# Patient Record
Sex: MALE | Race: WHITE | Employment: FULL TIME | URBAN - METROPOLITAN AREA
[De-identification: names, ages, dates, MRNs, and addresses within clinical notes are randomized per-mention and may not be internally consistent; named-entity substitution may affect disease eponyms.]

---

## 2018-05-06 ENCOUNTER — HOSPITAL ENCOUNTER (EMERGENCY)
Age: 58
Discharge: HOME | End: 2018-05-06
Attending: EMERGENCY MEDICINE
Payer: COMMERCIAL

## 2018-05-06 ENCOUNTER — APPOINTMENT (OUTPATIENT)
Dept: ULTRASOUND IMAGING | Age: 58
End: 2018-05-06
Payer: COMMERCIAL

## 2018-05-06 VITALS
WEIGHT: 265 LBS | OXYGEN SATURATION: 97 % | DIASTOLIC BLOOD PRESSURE: 88 MMHG | RESPIRATION RATE: 18 BRPM | HEART RATE: 80 BPM | SYSTOLIC BLOOD PRESSURE: 152 MMHG | TEMPERATURE: 98 F | BODY MASS INDEX: 37.94 KG/M2 | HEIGHT: 70 IN

## 2018-05-06 DIAGNOSIS — M79.604 RIGHT LEG PAIN: Primary | ICD-10-CM

## 2018-05-06 DIAGNOSIS — S80.11XA CONTUSION OF RIGHT LOWER EXTREMITY, INITIAL ENCOUNTER: ICD-10-CM

## 2018-05-06 PROCEDURE — 99283 EMERGENCY DEPT VISIT LOW MDM: CPT

## 2018-05-06 PROCEDURE — 93971 EXTREMITY STUDY: CPT

## 2018-05-06 ASSESSMENT — PAIN DESCRIPTION - ORIENTATION: ORIENTATION: RIGHT

## 2018-05-06 ASSESSMENT — PAIN DESCRIPTION - LOCATION: LOCATION: LEG

## 2018-05-06 ASSESSMENT — PAIN DESCRIPTION - PAIN TYPE: TYPE: ACUTE PAIN

## 2018-05-06 ASSESSMENT — PAIN SCALES - GENERAL: PAINLEVEL_OUTOF10: 4

## 2018-05-06 ASSESSMENT — PAIN DESCRIPTION - FREQUENCY: FREQUENCY: CONTINUOUS

## 2021-10-11 ENCOUNTER — APPOINTMENT (OUTPATIENT)
Dept: GENERAL RADIOLOGY | Age: 61
End: 2021-10-11
Payer: COMMERCIAL

## 2021-10-11 ENCOUNTER — HOSPITAL ENCOUNTER (EMERGENCY)
Age: 61
Discharge: HOME | End: 2021-10-11
Payer: COMMERCIAL

## 2021-10-11 VITALS
TEMPERATURE: 97 F | HEART RATE: 83 BPM | HEIGHT: 70 IN | OXYGEN SATURATION: 97 % | DIASTOLIC BLOOD PRESSURE: 87 MMHG | WEIGHT: 270 LBS | SYSTOLIC BLOOD PRESSURE: 146 MMHG | RESPIRATION RATE: 18 BRPM | BODY MASS INDEX: 38.65 KG/M2

## 2021-10-11 DIAGNOSIS — S91.114A LACERATION OF LESSER TOE OF RIGHT FOOT WITHOUT FOREIGN BODY PRESENT OR DAMAGE TO NAIL, INITIAL ENCOUNTER: Primary | ICD-10-CM

## 2021-10-11 DIAGNOSIS — Z23 NEED FOR TETANUS BOOSTER: ICD-10-CM

## 2021-10-11 PROCEDURE — 73630 X-RAY EXAM OF FOOT: CPT

## 2021-10-11 PROCEDURE — 6370000000 HC RX 637 (ALT 250 FOR IP): Performed by: NURSE PRACTITIONER

## 2021-10-11 PROCEDURE — 90471 IMMUNIZATION ADMIN: CPT | Performed by: NURSE PRACTITIONER

## 2021-10-11 PROCEDURE — 90715 TDAP VACCINE 7 YRS/> IM: CPT | Performed by: NURSE PRACTITIONER

## 2021-10-11 PROCEDURE — 99283 EMERGENCY DEPT VISIT LOW MDM: CPT

## 2021-10-11 PROCEDURE — 6360000002 HC RX W HCPCS: Performed by: NURSE PRACTITIONER

## 2021-10-11 PROCEDURE — 2500000003 HC RX 250 WO HCPCS: Performed by: NURSE PRACTITIONER

## 2021-10-11 PROCEDURE — 12001 RPR S/N/AX/GEN/TRNK 2.5CM/<: CPT

## 2021-10-11 RX ORDER — CEPHALEXIN 500 MG/1
500 CAPSULE ORAL 4 TIMES DAILY
Qty: 27 CAPSULE | Refills: 0 | Status: SHIPPED | OUTPATIENT
Start: 2021-10-11 | End: 2021-10-18

## 2021-10-11 RX ORDER — CEPHALEXIN 500 MG/1
500 CAPSULE ORAL ONCE
Status: COMPLETED | OUTPATIENT
Start: 2021-10-11 | End: 2021-10-11

## 2021-10-11 RX ORDER — GINSENG 100 MG
CAPSULE ORAL ONCE
Status: COMPLETED | OUTPATIENT
Start: 2021-10-11 | End: 2021-10-11

## 2021-10-11 RX ORDER — BACITRACIN, NEOMYCIN, POLYMYXIN B 400; 3.5; 5 [USP'U]/G; MG/G; [USP'U]/G
OINTMENT TOPICAL 2 TIMES DAILY
Qty: 1 EACH | Refills: 0 | Status: SHIPPED | OUTPATIENT
Start: 2021-10-11 | End: 2021-10-18

## 2021-10-11 RX ORDER — LIDOCAINE HYDROCHLORIDE 10 MG/ML
5 INJECTION, SOLUTION INFILTRATION; PERINEURAL ONCE
Status: COMPLETED | OUTPATIENT
Start: 2021-10-11 | End: 2021-10-11

## 2021-10-11 RX ADMIN — CEPHALEXIN 500 MG: 500 CAPSULE ORAL at 21:00

## 2021-10-11 RX ADMIN — LIDOCAINE HYDROCHLORIDE 5 ML: 10 INJECTION, SOLUTION INFILTRATION; PERINEURAL at 18:41

## 2021-10-11 RX ADMIN — TETANUS TOXOID, REDUCED DIPHTHERIA TOXOID AND ACELLULAR PERTUSSIS VACCINE, ADSORBED 0.5 ML: 5; 2.5; 8; 8; 2.5 SUSPENSION INTRAMUSCULAR at 18:41

## 2021-10-11 RX ADMIN — BACITRACIN: 500 OINTMENT TOPICAL at 18:41

## 2021-10-11 ASSESSMENT — PAIN DESCRIPTION - DESCRIPTORS: DESCRIPTORS: ACHING

## 2021-10-11 ASSESSMENT — PAIN DESCRIPTION - FREQUENCY: FREQUENCY: INTERMITTENT

## 2021-10-11 ASSESSMENT — PAIN SCALES - GENERAL
PAINLEVEL_OUTOF10: 4
PAINLEVEL_OUTOF10: 4

## 2021-10-11 ASSESSMENT — PAIN DESCRIPTION - LOCATION: LOCATION: TOE (COMMENT WHICH ONE)

## 2021-10-11 ASSESSMENT — PAIN DESCRIPTION - ORIENTATION: ORIENTATION: RIGHT

## 2021-10-11 ASSESSMENT — PAIN DESCRIPTION - PAIN TYPE: TYPE: ACUTE PAIN

## 2021-10-11 NOTE — ED TRIAGE NOTES
FIRST PROVIDER CONTACT ASSESSMENT NOTE      Department of Emergency Medicine   10/11/21  5:20 PM EDT    Chief Complaint: No chief complaint on file. History of Present Illness:    Mich Jolly is a 62 y.o. male who presents to the ED by private car for toe laceration     Focused Screening Exam:  Constitutional:  Alert, appears stated age and is in no distress. *ALLERGIES*     Patient has no known allergies.      ED Triage Vitals   BP Temp Temp src Pulse Resp SpO2 Height Weight   -- -- -- -- -- -- -- --        Initial Plan of Care:  Initiate Treatment-Testing, Proceed toTreatment Area When Bed Available for ED Attending/MLP to Continue Care    -----------------END OF FIRST PROVIDER CONTACT ASSESSMENT NOTE--------------  Electronically signed by Mya Vieira PA-C   DD: 10/11/21

## 2021-10-11 NOTE — ED PROVIDER NOTES
[facility]  Department of Emergency Medicine   ED  Encounter Note  Admit Date/RoomTime: 10/11/2021  6:24 PM  ED Room: 36/36    NAME: Patrica Corado  : 1960  MRN: [id number]     Chief Complaint:  Laceration (right foot toe next to big toe)    History of Present Illness       Patrica Corado is a 62 y.o. old male presenting to the emergency department by private vehicle, for a laceration to the plantar 2nd toe on right foot, caused by on a basement step floor, which occurred at home approximately 16 hour(s) prior to arrival.  There is not a possibility of retained foreign body in the affected area. Bleeding is  controlled. He takes no blood thinning agents. There is pain at injury site. States he tripped over his shoes in the basement and cut his palm of his foot. He states he cleaned it before going to work. He states he had a dressing over it. He is a current diabetic and states his blood sugars have been controlled. Tetanus Status:  more than 5 years ago. ROS   Pertinent positives and negatives are stated within HPI, all other systems reviewed and are negative. Past Medical History:  has a past medical history of Hyperlipidemia and Hypertension. Surgical History:  has a past surgical history that includes Total shoulder arthroplasty and hernia repair. Social History:  reports that he has never smoked. He has never used smokeless tobacco. He reports that he does not drink alcohol and does not use drugs. Family History: family history includes Coronary Art Dis in his father; Elevated Lipids in his father. Allergies: Patient has no known allergies.     Physical Exam   Oxygen Saturation Interpretation: Normal.        ED Triage Vitals   BP Temp Temp Source Pulse Resp SpO2 Height Weight   10/11/21 1722 10/11/21 1722 10/11/21 1722 10/11/21 1722 10/11/21 1903 10/11/21 1722 10/11/21 1722 10/11/21 1722   (!) 146/87 97 °F (36.1 °C) Temporal 83 18 97 % 5' 10\" (1.778 m) 270 lb (122.5 kg)         Constitutional:  Alert, development consistent with age. Neck:  Normal ROM. Supple. Extremity(s):  Right: 2nd toe. Tenderness:  moderate. Swelling: Mild. Calf:  No evidence of DVT seen on physical exam. No cords or calf tenderness. No significant calf/ankle edema. .            Deformity: No.               ROM: full range with pain. Skin:  2cm laceration on plantar aspect. Neurovascular: Motor deficit: none. Sensory deficit: none. Pulse deficit: none. Capillary refill: normal.  Gait:  normal without use of mobility aid. Lymphatics: No lymphangitis or adenopathy noted. Neurological:  Oriented. Motor functions intact. Lab / Imaging Results   (All laboratory and radiology results have been personally reviewed by myself)  Labs:  No results found for this visit on 10/11/21. Imaging: All Radiology results interpreted by Radiologist unless otherwise noted. XR FOOT RIGHT (MIN 3 VIEWS)   Final Result   Presence of soft tissues swelling diffusely, in the metatarsal region,   towards the base of the toe more towards the 2nd toe and great toe. No intrinsic acute bone or joint abnormality is identified the.           ED Course / Medical Decision Making     Medications   Tetanus-Diphth-Acell Pertussis (BOOSTRIX) injection 0.5 mL (0.5 mLs IntraMUSCular Given 10/11/21 1841)   lidocaine 1 % injection 5 mL (5 mLs IntraDERmal Given 10/11/21 1841)   bacitracin ointment ( Topical Given 10/11/21 1841)   cephALEXin (KEFLEX) capsule 500 mg (500 mg Oral Given 10/11/21 2100)        Re-examination:  10/11/21       Time: 2047- Spoke in great length of signs and symptoms of infection with patient, proper wound care and importance of close follow up with patient, He states verbal understanding. He ambulates with a steady gait. No neurovascular compromise.  All compartments are soft.    Consult(s):   None    Procedure(s):     LACERATION REPAIR  PROCEDURE NOTE:  Unless otherwise indicated, this procedure was done or directly supervised by the ED attending. Laceration #: 1. Location: 2nd toe on right foot plantar aspect  Length: 2cm. The wound area was cleansend with shur-clens and draped in a sterile fashion. The wound area was anesthetized with Lidocaine 1% without epinephrine. WOUND COMPLEXITY:    Debridement: None. Undermining: None. Wound Margins Revised: None. Flaps Aligned: no. The wound was explored with the following results no foreign body or tendon injury seen. The wound was closed with 4-0 Ethilon using interrupted sutures. Dressing:  bacitracin and a sterile dressing was placed. Total number suture: 5    MDM:  Patient presents to the ED for laceration to right 2nd toe. Differential diagnoses included but not limited to duration versus abrasion with or without fracture. Workup in the ED revealed upon examination patient has a 2 cm laceration on the plantar aspect of the second digit. It is clean. He states this happened earlier this morning and he cleaned it and put triple antibiotic on. Due to the delay in closure discussed with patient this is high risk for infection. He states verbal understanding. Patient is a diabetic and states his blood sugars are controlled. Discussed appropriate wound care signs and symptoms of infection and extremely close follow-up. He states verbal understanding. X-ray results reveal presence of soft tissue swelling in the metatarsal region towards the base of the tore of the 2nd toe and great toe. Patient's Tdap vaccination was updated today. Patient continues to be non-toxic on re-evaluation. Findings were discussed with the patient and reasons to immediately return to the ED were articulated to them. They will follow-up with their PMD and podiatry. Sterile dressing was placed over suture repair and post op shoe.  Due to patient being a diabetic and on the posterior foot and alignment and closure patient we discharged home with Keflex to prevent infection. Discussed appropriate use potential side effects of starting this medication. Also be discharged home with Neosporin. Educated patient on appropriate use and potential side effects of starting this medication he states verbal understanding. Plan of Care/Counseling:  FRANCISCA Mitchell CNP reviewed today's visit with the patient in addition to providing specific details for the plan of care and counseling regarding the diagnosis and prognosis. Questions are answered at this time and are agreeable with the plan. Assessment      1. Laceration of lesser toe of right foot without foreign body present or damage to nail, initial encounter    2. Need for tetanus booster      Plan   Discharged home. Patient condition is stable    New Medications     Discharge Medication List as of 10/11/2021  8:57 PM      START taking these medications    Details   neomycin-bacitracin-polymyxin (NEOSPORIN) 400-5-5000 ointment Apply topically 2 times daily for 7 days Apply topically 2 times daily. , Topical, 2 TIMES DAILY Starting Mon 10/11/2021, Until Mon 10/18/2021, For 7 days, Disp-1 each, R-0, Print      cephALEXin (KEFLEX) 500 MG capsule Take 1 capsule by mouth 4 times daily for 7 days, Disp-27 capsule, R-0Print           Electronically signed by FRANCISCA Mitchell CNP   DD: 10/11/21  This report was transcribed using voice recognition software. Every effort was made to ensure accuracy; however, inadvertent computerized transcription errors may be present.   END OF ED PROVIDER NOTE       FRANCISCA Mitchell CNP  10/12/21 0154

## 2022-02-22 ENCOUNTER — HOSPITAL ENCOUNTER (OUTPATIENT)
Age: 62
Discharge: HOME | End: 2022-02-24

## 2022-02-22 PROCEDURE — 88305 TISSUE EXAM BY PATHOLOGIST: CPT

## 2025-04-22 ENCOUNTER — HOSPITAL ENCOUNTER (OUTPATIENT)
Age: 65
Discharge: HOME | End: 2025-04-24

## 2025-04-29 LAB — SURGICAL PATHOLOGY REPORT: NORMAL

## 2025-06-12 ENCOUNTER — RESULTS FOLLOW-UP (OUTPATIENT)
Dept: PRIMARY CARE CLINIC | Age: 65
End: 2025-06-12

## 2025-06-12 ENCOUNTER — OFFICE VISIT (OUTPATIENT)
Dept: PRIMARY CARE CLINIC | Age: 65
End: 2025-06-12
Payer: COMMERCIAL

## 2025-06-12 VITALS
OXYGEN SATURATION: 94 % | DIASTOLIC BLOOD PRESSURE: 84 MMHG | HEART RATE: 76 BPM | WEIGHT: 265 LBS | HEIGHT: 70 IN | TEMPERATURE: 98.4 F | BODY MASS INDEX: 37.94 KG/M2 | SYSTOLIC BLOOD PRESSURE: 130 MMHG

## 2025-06-12 DIAGNOSIS — M54.41 ACUTE RIGHT-SIDED LOW BACK PAIN WITH RIGHT-SIDED SCIATICA: Primary | ICD-10-CM

## 2025-06-12 PROCEDURE — 3079F DIAST BP 80-89 MM HG: CPT | Performed by: NURSE PRACTITIONER

## 2025-06-12 PROCEDURE — 96372 THER/PROPH/DIAG INJ SC/IM: CPT | Performed by: NURSE PRACTITIONER

## 2025-06-12 PROCEDURE — 99213 OFFICE O/P EST LOW 20 MIN: CPT | Performed by: NURSE PRACTITIONER

## 2025-06-12 PROCEDURE — 3075F SYST BP GE 130 - 139MM HG: CPT | Performed by: NURSE PRACTITIONER

## 2025-06-12 RX ORDER — DEXAMETHASONE SODIUM PHOSPHATE 10 MG/ML
10 INJECTION, SOLUTION INTRA-ARTICULAR; INTRALESIONAL; INTRAMUSCULAR; INTRAVENOUS; SOFT TISSUE ONCE
Status: COMPLETED | OUTPATIENT
Start: 2025-06-12 | End: 2025-06-12

## 2025-06-12 RX ORDER — PREDNISONE 10 MG/1
10 TABLET ORAL 2 TIMES DAILY
Qty: 10 TABLET | Refills: 0 | Status: SHIPPED | OUTPATIENT
Start: 2025-06-12 | End: 2025-06-17

## 2025-06-12 RX ADMIN — DEXAMETHASONE SODIUM PHOSPHATE 10 MG: 10 INJECTION, SOLUTION INTRA-ARTICULAR; INTRALESIONAL; INTRAMUSCULAR; INTRAVENOUS; SOFT TISSUE at 10:20

## 2025-06-12 NOTE — PROGRESS NOTES
BMI 38.02 kg/m²    Oxygen Saturation Interpretation: Normal.    Constitutional:  Alert, development consistent with age.  HEENT:  NC/NT.  Airway patent.  Eyes no discharge  External ears normal  Neck:  Normal ROM.   Lungs:  Clear to auscultation and breath sounds equal.  Heart:  Regular rate and rhythm, normal heart sounds, without pathological murmurs, ectopy, gallops, or rubs.  Back: Tenderness: TTP over right lumbar paraspinal muscles and right SI with mild/moderate midline tenderness.              Swelling: No edema.               Range of Motion: Decreased d/t pain            CVA Tenderness: No CVA tenderness bilaterally.            Skin:  No bruising, redness, abrasions, or rashes.  Skin:  Normal turgor.  Warm, dry, without visible rash.  Neurological:  Alert and oriented.  Motor functions intact.    Lab / Imaging Results   (All laboratory and radiology results have been personally reviewed by myself)  Labs:      Imaging:  All Radiology results interpreted by Radiologist unless otherwise noted.        Assessment / Plan     Impression(s):  1. Acute right-sided low back pain with right-sided sciatica        Disposition:  Disposition: Decadron 10 mg IM now, start Prednisone and Zanaflex PRN as ordered. STAT Lumbar Xray. Advised to monitor BS readings. Apply ice to affected areas. Further treatment plan will be based on xray once received. Return to walk in care or go to ED w/any worsening or concerning issues